# Patient Record
Sex: MALE | Race: ASIAN | NOT HISPANIC OR LATINO | Employment: UNEMPLOYED | URBAN - METROPOLITAN AREA
[De-identification: names, ages, dates, MRNs, and addresses within clinical notes are randomized per-mention and may not be internally consistent; named-entity substitution may affect disease eponyms.]

---

## 2018-12-21 ENCOUNTER — OFFICE VISIT (OUTPATIENT)
Dept: FAMILY MEDICINE | Facility: CLINIC | Age: 48
End: 2018-12-21
Payer: COMMERCIAL

## 2018-12-21 VITALS
OXYGEN SATURATION: 100 % | SYSTOLIC BLOOD PRESSURE: 112 MMHG | WEIGHT: 125.4 LBS | TEMPERATURE: 97.5 F | BODY MASS INDEX: 23.08 KG/M2 | HEART RATE: 66 BPM | DIASTOLIC BLOOD PRESSURE: 79 MMHG | RESPIRATION RATE: 16 BRPM | HEIGHT: 62 IN

## 2018-12-21 DIAGNOSIS — Z23 INFLUENZA VACCINE NEEDED: ICD-10-CM

## 2018-12-21 DIAGNOSIS — M54.59 MECHANICAL LOW BACK PAIN: Primary | ICD-10-CM

## 2018-12-21 SDOH — HEALTH STABILITY: MENTAL HEALTH: HOW OFTEN DO YOU HAVE A DRINK CONTAINING ALCOHOL?: NEVER

## 2018-12-21 ASSESSMENT — MIFFLIN-ST. JEOR: SCORE: 1328.06

## 2018-12-21 NOTE — PROGRESS NOTES
"SUBJECTIVE  HPI: Ladan Brandon is a 48 year old male who presents to [facility] care.    Moved to [place] from [place] in September. Moved to  in 2014 from [facility]. [place].  with 4 kids.     Chews betel nut. Former smoker. About 20 pack yr hx.     Never been hospitalized. No surgeries.    Back pain: has had for over 10 years. Located on left lower back. Intermittent. Pain occurs with lifting heavy things. At work, lifts heavy objects stocking. Radiates to upper back.  No radiation to legs. Has tried an oral medicaiton from [employer] and [employer] which has helped. Currently, is not taking any medication. Denies numbness, tingling, weakness. No saddle anesthesia. No fevers. No gait instability. Also occurred previously when playing [employer] in [employer].    ROS: per HPI.     PMH: Chronic left low back pain.     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     History   Smoking Status     Former Smoker     Years: 20.00     Quit date: 2012   Smokeless Tobacco     Never Used       OBJECTIVE:  Vitals: /79 (BP Location: Right arm, Patient Position: Sitting, Cuff Size: Adult Regular)   Pulse 66   Temp 97.5  F (36.4  C) (Oral)   Resp 16   Ht 1.575 m (5' 2\")   Wt 56.9 kg (125 lb 6.4 oz)   SpO2 100%   BMI 22.94 kg/m    General: Pleasant. Middle-aged man. Thin. No distress.  Heart: Regular rate and rhythm. No murmurs, rubs, or gallops.  Extremities: Extremities warm and well-perfused.  Lungs: Clear to auscultation bilaterally.  Back: Normal to inspection.  No tenderness over spinous processes.  Mild tenderness over left lumbar paraspinal muscles.  No tenderness over SI joints.  Normal ROM in flexion, extension, lateral flexion, and torsion.  Normal heel (L4) and toe (S1) walking.  5/5 strength on resisted great toe dorsiflexion (L5).  Normal patellar reflexes (L4) and Achilles reflexes (S1).  Negative straight leg raise bilaterally.  "     ASSESSMENT & PLAN:      Htjeovany was seen today for establish care.    Diagnoses and all orders for this visit:    Mechanical low back pain: Discussed back care tips.  Provided patient with home exercises.  Recommended ice/heat packs.  Will try naproxen to take as needed for moderate back pain.  -     naproxen (NAPROSYN) 375 MG tablet; Take 1 tablet (375 mg) by mouth 2 times daily as needed for moderate pain    Return to clinic in 2-4 weeks for complete physical.      The patient was actively involved in the decision making process, and all the questions were answered to their satisfaction prior to leaving.       Patient precepted with attending physician, Dr. Méndez.    Tami Hawk, DO   PGY-3 [facility]  Pager: [phone]

## 2018-12-21 NOTE — PROGRESS NOTES
Preceptor Attestation:   Patient seen, evaluated and discussed with the resident. I have verified the content of the note, which accurately reflects my assessment of the patient and the plan of care.   Supervising Physician:  Emanuel Méndez MD

## 2018-12-21 NOTE — NURSING NOTE
Due to patient being non-English speaking/uses sign language, an  was used for this visit. Only for face-to-face interpretation by an external agency, date and length of interpretation can be found on the scanned worksheet.       name: Todd Lawrence  Language: Romana  Agency:  [employer]  Telephone number: [phone]  Type of interpretation:  Face-to-face, spoken

## 2018-12-21 NOTE — NURSING NOTE
Injectable influenza vaccine documentation    1. Has the patient received the information for the influenza vaccine? YES    2. Does the patient have a severe allergy to eggs (Patients with a severe egg allergy should be assessed by a medical provider, RN, or clinical pharmacist. If they receive the influenza vaccine, please have them observed for 15 minutes.)? No    3. Has the patient had an allergic reaction to previous influenza vaccines? No    4. Has the patient had any severe allergic reactions to past influenza vaccines ? No       5. Does patient have a history of Guillain-Bucoda syndrome? No      Based on responses above, I administered the influenza vaccine.  Liberty Castro

## 2018-12-21 NOTE — PATIENT INSTRUCTIONS
Patient Education   Patient Education   Dear Ladan Brandon,    It was a pleasure seeing you in clinic today. Follow up for Complete physical in 2-4 weeks. Please do not hesitate to call or return to clinic if you have an questions or concerns.      Sincerely,    Dr. Hawk    Lumbar Stretch (Flexibility)    1. Lie on your back on the floor, with your knees bent and your feet flat on the floor. Don t press your neck or lower back to the floor.  2. Pull one knee up toward your chest. Clasp your hands under your thigh to help pull.  3. Hold for 30 to 60 seconds. Lower your leg back down to the floor.  4. Repeat 2 times, or as instructed.  5. Switch legs and repeat.  Date Last Reviewed: 3/10/2016    0324-2378 [employer]. [address] [place], [place] [id number]. All rights reserved. This information is not intended as a substitute for professional medical care. Always follow your healthcare professional's instructions.           Back Care Tips    Caring for your back  These are things you can do to prevent a recurrence of acute back pain and to reduce symptoms from chronic back pain:    Maintain a healthy weight. If you are overweight, losing weight will help most types of back pain.    Exercise is an important part of recovery from most types of back pain. The muscles behind and in front of the spine support the back. This means strengthening both the back muscles and the abdominal muscles will provide better support for your spine.     Swimming and brisk walking are good overall exercises to improve your fitness level.    Practice safe lifting methods (below).    Practice good posture when sitting, standing and walking. Avoid prolonged sitting. This puts more stress on the lower back than standing or walking.    Wear quality shoes with sufficient arch support. Foot and ankle alignment can affect back symptoms. Women should avoid wearing high heels.    Therapeutic massage can help relax the back muscles  without stretching them.    During the first 24 to 72 hours after an acute injury or flare-up of chronic back pain, apply an ice pack to the painful area for 20 minutes and then remove it for 20 minutes, over a period of 60 to 90 minutes, or several times a day. As a safety precaution, do not use a heating pad at bedtime. Sleeping on a heating pad can lead to skin burns or tissue damage.    You can alternate ice and heat therapies.  Medicines  Talk to your healthcare provider before using medicines, especially if you have other medical problems or are taking other medicines.    You may use acetaminophen or ibuprofen to control pain, unless your healthcare provider prescribed other pain medicine. If you have chronic conditions like diabetes, liver or kidney disease, stomach ulcers, or gastrointestinal bleeding, or are taking blood thinners, talk with your healthcare provider before taking any medicines.    Be careful if you are given prescription pain medicines, narcotics, or medicine for muscle spasm. They can cause drowsiness, affect your coordination, reflexes, and judgment. Do not drive or operate heavy machinery while taking these types of medicines. Take prescription pain medicine only as prescribed by your healthcare provider.  Lumbar stretch  Here is a simple stretching exercise that will help relax muscle spasm and keep your back more limber. If exercise makes your back pain worse, don t do it.    Lie on your back with your knees bent and both feet on the ground.    Slowly raise your left knee to your chest as you flatten your lower back against the floor. Hold for 5 seconds.    Relax and repeat the exercise with your right knee.    Do 10 of these exercises for each leg.  Safe lifting method    Don t bend over at the waist to lift an object off the floor.  Instead, bend your knees and hips in a squat.     Keep your back and head upright    Hold the object close to your body, directly in front of  you.    Straighten your legs to lift the object.     Lower the object to the floor in the reverse fashion.    If you must slide something across the floor, push it.  Posture tips  Sitting  Sit in chairs with straight backs or low-back support. Keep your knees lower than your hips, with your feet flat on the floor.  When driving, sit up straight. Adjust the seat forward so you are not leaning toward the steering wheel.  A small pillow or rolled towel behind your lower back may help if you are driving long distances.   Standing  When standing for long periods, shift most of your weight to one leg at a time. Alternate legs every few minutes.   Sleeping  The best way to sleep is on your side with your knees bent. Put a low pillow under your head to support your neck in a neutral spine position. Avoid thick pillows that bend your neck to one side. Put a pillow between your legs to further relax your lower back. If you sleep on your back, put pillows under your knees to support your legs in a slightly flexed position. Use a firm mattress. If your mattress sags, replace it, or use a 1/2-inch plywood board under the mattress to add support.  Follow-up care  Follow up with your healthcare provider, or as advised.  If X-rays, a CT scan or an MRI scan were taken, they will be reviewed by a radiologist. You will be notified of any new findings that may affect your care.  Call 911  Call 911 if any of the following occur:    Trouble breathing    Confusion    Very drowsy    Fainting or loss of consciousness    Rapid or very slow heart rate    Loss of  bowel or bladder control  When to seek medical advice  Call your healthcare provider right away if any of the following occur:    Pain becomes worse or spreads to your arms or legs    Weakness or numbness in one or both arms or legs    Numbness in the groin area  Date Last Reviewed: 6/1/2016 2000-2018 [employer]. [address] [facility], [place] [address]. All rights  reserved. This information is not intended as a substitute for professional medical care. Always follow your healthcare professional's instructions.

## 2022-01-25 ENCOUNTER — OFFICE VISIT (OUTPATIENT)
Dept: FAMILY MEDICINE | Facility: CLINIC | Age: 52
End: 2022-01-25
Payer: COMMERCIAL

## 2022-01-25 VITALS
TEMPERATURE: 97.6 F | WEIGHT: 131.8 LBS | BODY MASS INDEX: 24.11 KG/M2 | OXYGEN SATURATION: 99 % | HEART RATE: 64 BPM | RESPIRATION RATE: 16 BRPM

## 2022-01-25 DIAGNOSIS — Z23 NEED FOR VACCINATION: ICD-10-CM

## 2022-01-25 DIAGNOSIS — Z23 NEED FOR PROPHYLACTIC VACCINATION AND INOCULATION AGAINST INFLUENZA: ICD-10-CM

## 2022-01-25 DIAGNOSIS — Z00.00 ROUTINE GENERAL MEDICAL EXAMINATION AT A HEALTH CARE FACILITY: Primary | ICD-10-CM

## 2022-01-25 DIAGNOSIS — R53.83 FATIGUE, UNSPECIFIED TYPE: ICD-10-CM

## 2022-01-25 LAB
BASOPHILS # BLD AUTO: 0 10E3/UL (ref 0–0.2)
BASOPHILS NFR BLD AUTO: 1 %
CHOLEST SERPL-MCNC: 269 MG/DL
EOSINOPHIL # BLD AUTO: 0.2 10E3/UL (ref 0–0.7)
EOSINOPHIL NFR BLD AUTO: 3 %
ERYTHROCYTE [DISTWIDTH] IN BLOOD BY AUTOMATED COUNT: 12.3 % (ref 10–15)
FASTING STATUS PATIENT QL REPORTED: ABNORMAL
HBA1C MFR BLD: 5.6 % (ref 0–5.6)
HCT VFR BLD AUTO: 44.1 % (ref 40–53)
HDLC SERPL-MCNC: 43 MG/DL
HGB BLD-MCNC: 14.7 G/DL (ref 13.3–17.7)
HIV 1+2 AB+HIV1 P24 AG SERPL QL IA: NEGATIVE
IMM GRANULOCYTES # BLD: 0 10E3/UL
IMM GRANULOCYTES NFR BLD: 0 %
LDLC SERPL CALC-MCNC: 185 MG/DL
LYMPHOCYTES # BLD AUTO: 1.5 10E3/UL (ref 0.8–5.3)
LYMPHOCYTES NFR BLD AUTO: 26 %
MCH RBC QN AUTO: 28.8 PG (ref 26.5–33)
MCHC RBC AUTO-ENTMCNC: 33.3 G/DL (ref 31.5–36.5)
MCV RBC AUTO: 87 FL (ref 78–100)
MONOCYTES # BLD AUTO: 0.3 10E3/UL (ref 0–1.3)
MONOCYTES NFR BLD AUTO: 5 %
NEUTROPHILS # BLD AUTO: 3.8 10E3/UL (ref 1.6–8.3)
NEUTROPHILS NFR BLD AUTO: 65 %
PLATELET # BLD AUTO: 248 10E3/UL (ref 150–450)
RBC # BLD AUTO: 5.1 10E6/UL (ref 4.4–5.9)
TRIGL SERPL-MCNC: 207 MG/DL
WBC # BLD AUTO: 5.7 10E3/UL (ref 4–11)

## 2022-01-25 PROCEDURE — 36415 COLL VENOUS BLD VENIPUNCTURE: CPT | Performed by: FAMILY MEDICINE

## 2022-01-25 PROCEDURE — 99000 SPECIMEN HANDLING OFFICE-LAB: CPT | Performed by: FAMILY MEDICINE

## 2022-01-25 PROCEDURE — 86787 VARICELLA-ZOSTER ANTIBODY: CPT | Performed by: FAMILY MEDICINE

## 2022-01-25 PROCEDURE — 90686 IIV4 VACC NO PRSV 0.5 ML IM: CPT | Performed by: FAMILY MEDICINE

## 2022-01-25 PROCEDURE — 90715 TDAP VACCINE 7 YRS/> IM: CPT | Performed by: FAMILY MEDICINE

## 2022-01-25 PROCEDURE — 86708 HEPATITIS A ANTIBODY: CPT | Performed by: FAMILY MEDICINE

## 2022-01-25 PROCEDURE — 87389 HIV-1 AG W/HIV-1&-2 AB AG IA: CPT | Performed by: FAMILY MEDICINE

## 2022-01-25 PROCEDURE — 90472 IMMUNIZATION ADMIN EACH ADD: CPT | Performed by: FAMILY MEDICINE

## 2022-01-25 PROCEDURE — 90471 IMMUNIZATION ADMIN: CPT | Performed by: FAMILY MEDICINE

## 2022-01-25 PROCEDURE — 85025 COMPLETE CBC W/AUTO DIFF WBC: CPT | Performed by: FAMILY MEDICINE

## 2022-01-25 PROCEDURE — 86762 RUBELLA ANTIBODY: CPT | Performed by: FAMILY MEDICINE

## 2022-01-25 PROCEDURE — 83036 HEMOGLOBIN GLYCOSYLATED A1C: CPT | Performed by: FAMILY MEDICINE

## 2022-01-25 PROCEDURE — 99386 PREV VISIT NEW AGE 40-64: CPT | Mod: 25 | Performed by: FAMILY MEDICINE

## 2022-01-25 PROCEDURE — 86706 HEP B SURFACE ANTIBODY: CPT | Performed by: FAMILY MEDICINE

## 2022-01-25 PROCEDURE — 86803 HEPATITIS C AB TEST: CPT | Performed by: FAMILY MEDICINE

## 2022-01-25 PROCEDURE — 80061 LIPID PANEL: CPT | Performed by: FAMILY MEDICINE

## 2022-01-25 PROCEDURE — 87350 HEPATITIS BE AG IA: CPT | Mod: 90 | Performed by: FAMILY MEDICINE

## 2022-01-25 NOTE — LETTER
2022      Ladan Brandon  [address]   [place] [address]        Dear ,    We are writing to inform you of your test results.      You have protection for hepatitis A and B,/that is good   You have protection for Rubella and varicella/that is good   You are negative for HIV and Hepatitis C/that is good    Your cholesterol is high , eat more vegetables and will discussed cholesterol reduction in you next visit     Resulted Orders   Rubella Antibody IgG   Result Value Ref Range    Rubella Teresa IgG Instrument Value 2.97 (H) <0.90 Index    Rubella Antibody IgG Positive (A) Negative      Comment:      Suggests previous exposure or immunization and probable immunity.   Varicella Zoster Virus Antibody IgG   Result Value Ref Range    VZV Teresa IgG Instrument Value 187.8 (H) <135.0 Index    Varicella Zoster Antibody IgG Positive (A) No detectable antibody.       Comment:      Suggests previous exposure or immunization and probable immunity.   HIV Ag/Ab Screen Cascade   Result Value Ref Range    HIV Antigen Antibody Combo Negative Negative   Hepatitis C antibody   Result Value Ref Range    Hepatitis C Antibody Negative Negative    Narrative    Assay performance characteristics have not been established for newborns, infants, children (<18 years) or populations of immunocompromised or immunosuppressed patients.    Hepatitis A Antibody IgG   Result Value Ref Range    Hepatitis A Antibody IgG Positive (A) Negative    Narrative    Performance of the Anti-HAV, IgG test  has not been established with  specimens (<= 2 months of age).   Hepatitis Be antigen   Result Value Ref Range    Hepatitis Be Agn Negative Negative      Comment:      Performed by [email or web address],  [address] [place] [id number] [phone]  [email or web address], Sue Diamond MD, Lab. Director   Hepatitis B Surface Antibody   Result Value Ref Range    Hepatitis B Surface Antibody Positive (A) Negative   Hemoglobin A1c   Result Value Ref  Range    Hemoglobin A1C 5.6 0.0 - 5.6 %      Comment:      Normal <5.7%   Prediabetes 5.7-6.4%    Diabetes 6.5% or higher     Note: Adopted from ADA consensus guidelines.   Lipid Profile   Result Value Ref Range    Cholesterol 269 (H) <=199 mg/dL    Triglycerides 207 (H) <=149 mg/dL    Direct Measure HDL 43 >=40 mg/dL      Comment:      HDL Cholesterol Reference Range:     0-2 years:   No reference ranges established for patients under 2 years old  at [facility] for lipid analytes.    2-8 years:  Greater than 45 mg/dL     18 years and older:   Female: Greater than or equal to 50 mg/dL   Male:   Greater than or equal to 40 mg/dL    LDL Cholesterol Calculated 185 (H) <=129 mg/dL    Patient Fasting > 8hrs? Unknown    CBC with platelets and differential   Result Value Ref Range    WBC Count 5.7 4.0 - 11.0 10e3/uL    RBC Count 5.10 4.40 - 5.90 10e6/uL    Hemoglobin 14.7 13.3 - 17.7 g/dL    Hematocrit 44.1 40.0 - 53.0 %    MCV 87 78 - 100 fL    MCH 28.8 26.5 - 33.0 pg    MCHC 33.3 31.5 - 36.5 g/dL    RDW 12.3 10.0 - 15.0 %    Platelet Count 248 150 - 450 10e3/uL    % Neutrophils 65 %    % Lymphocytes 26 %    % Monocytes 5 %    % Eosinophils 3 %    % Basophils 1 %    % Immature Granulocytes 0 %    Absolute Neutrophils 3.8 1.6 - 8.3 10e3/uL    Absolute Lymphocytes 1.5 0.8 - 5.3 10e3/uL    Absolute Monocytes 0.3 0.0 - 1.3 10e3/uL    Absolute Eosinophils 0.2 0.0 - 0.7 10e3/uL    Absolute Basophils 0.0 0.0 - 0.2 10e3/uL    Absolute Immature Granulocytes 0.0 <=0.4 10e3/uL       If you have any questions or concerns, please call the clinic at the number listed above.       Sincerely,      You Sotelo MD

## 2022-01-25 NOTE — PROGRESS NOTES
Male Physical Note      Concerns today: No special concerns today.  Not working  A  [employer]  was used for  this visit.     ROS:                      CONSTITUTIONAL: no fatigue, no unexpected change in weight  SKIN: no worrisome rashes, no worrisome moles, no worrisome lesions  EYES: no acute vision problems or changes  ENT: no ear problems, no mouth problems, no throat problems  RESP: no significant cough, no shortness of breath  CV: no chest pain, no palpitations, no new or worsening peripheral edema  GI: no nausea, no vomiting, no constipation, no diarrhea    History reviewed. No pertinent past medical history.     History reviewed. No pertinent family history.  Reviewed no other significant FH           Family History and past Medical History reviewed and unchanged/updated.    Social History     Tobacco Use     Smoking status: Former Smoker     Years: 20.00     Quit date: 2012     Years since quitting: 10.0     Smokeless tobacco: Never Used   Substance Use Topics     Alcohol use: No       Children ? yes  4    Has anyone hurt you physically, for example by pushing, hitting, slapping or kicking you or forcing you to have sex? Denies  Do you feel threatened or controlled by a partner, ex-partner or anyone in your life? Denies    RISK BEHAVIORS AND HEALTHY HABITS:  Tobacco Use/Smoking: None  Illicit Drug Use: None  ETOH: None  Do you use alcohol? No        Sexually Active: Yes  Diet (5-7 servings of fruits/veg daily): Yes   Exercise (30 min accumulated most days):Yes  Dental Care: No   Calcium 1500 mg/d:  Yes  Seat Belt Use: Yes       Colon CA Screening (>50-75 ):  Recommended and patient declined testing.  Patient declined PSA screening.        Immunization History   Administered Date(s) Administered     Influenza Vaccine, 6+MO IM (QUADRIVALENT W/PRESERVATIVES) 12/21/2018     Reviewed Immunization Record Today    EXAMINATION:  Pulse 64   Temp 97.6  F (36.4  C) (Oral)   Resp 16   Wt 59.8 kg (131  lb 12.8 oz)   SpO2 99%   BMI 24.11 kg/m    GENERAL: healthy, alert and no distress  EYES: Eyes grossly normal to inspection, extraocular movements - intact, and PERRL  HENT: ear canals- normal; TMs- normal; Nose- normal; Mouth- no ulcers, no lesions  NECK: no tenderness, no adenopathy, no asymmetry, no masses, no stiffness; thyroid- normal to palpation  RESP: lungs clear to auscultation - no rales, no rhonchi, no wheezes  BREAST: no masses, no tenderness, no nipple discharge, no palpable axillary masses or adenopathy  CV: regular rates and rhythm, normal S1 S2, no S3 or S4 and no murmur, no click or rub -  ABDOMEN: soft, no tenderness, no  hepatosplenomegaly, no masses, normal bowel sounds  MS: extremities- no gross deformities noted, no edema  SKIN: no suspicious lesions, no rashes  NEURO: strength and tone- normal, sensory exam- grossly normal, mentation- intact, speech- normal, reflexes- symmetric  BACK: no CVA tenderness, no paralumbar tenderness  - male: testicles- normal, no atrophy, no masses;  no inguinal hernias  RECTAL-declines :   Alert and oriented times 3; speech- coherent , normal rate and volume; able to articulate logical thoughts, able to abstract reason, no tangential thoughts, no hallucinations or delusions, affect- normal  LYMPHATICS: ant. cervical- normal, post. cervical- normal, axillary- normal, supraclavicular- normal, inguinal- normal    ASSESSMENT:  1. Health Care Maintenance: Normal Physical Exam  2 Fatigue/hx of dizziness CBC  1.Lipid panel ordered.   2 A1C  3. Declines COVID and colonoscopy  4.Accepts flu and TdaP    5 Vaccinations records not available : titers and up date vaccin per age recommendatiosn

## 2022-01-25 NOTE — NURSING NOTE
Due to patient being non-English speaking/uses sign language, an  was used for this visit. Only for face-to-face interpretation by an external agency, date and length of interpretation can be found on the scanned worksheet.     name: [id number]   Agency:  [facility]    Language: Romana   Telephone number: [phone]  Type of interpretation: Telephone, spoken

## 2022-01-25 NOTE — PATIENT INSTRUCTIONS
Preventive Health Recommendations  Male Ages 50 - 64    Yearly exam:             See your health care provider every year in order to  o   Review health changes.   o   Discuss preventive care.    o   Review your medicines if your doctor has prescribed any.     Have a cholesterol test every 5 years, or more frequently if you are at risk for high cholesterol/heart disease.     Have a diabetes test (fasting glucose) every three years. If you are at risk for diabetes, you should have this test more often.     Have a colonoscopy at age 50, or have a yearly FIT test (stool test). These exams will check for colon cancer.      Talk with your health care provider about whether or not a prostate cancer screening test (PSA) is right for you.    You should be tested each year for STDs (sexually transmitted diseases), if you re at risk.     Shots: Get a flu shot each year. Get a tetanus shot every 10 years.     Nutrition:    Eat at least 5 servings of fruits and vegetables daily.     Eat whole-grain bread, whole-wheat pasta and brown rice instead of white grains and rice.     Get adequate Calcium and Vitamin D.     Lifestyle    Exercise for at least 150 minutes a week (30 minutes a day, 5 days a week). This will help you control your weight and prevent disease.     Limit alcohol to one drink per day.     No smoking.     Wear sunscreen to prevent skin cancer.     See your dentist every six months for an exam and cleaning.     See your eye doctor every 1 to 2 years.       Consider COVID vaccination  Consider colonoscopy/colon cancer screning  Schedule visit foe  intermittent dizziness evaluation that you have had since childhood/checking anemia today    See dentist for teeth care

## 2022-01-26 LAB
HAV IGG SER QL IA: POSITIVE
HBV SURFACE AB SERPLBLD QL IA.RAPID: POSITIVE
HCV AB SERPL QL IA: NEGATIVE
RUBV IGG SERPL QL IA: 2.97 INDEX
RUBV IGG SERPL QL IA: POSITIVE
VZV IGG SER QL IA: 187.8 INDEX
VZV IGG SER QL IA: POSITIVE

## 2022-01-27 LAB — HBV E AG SERPL QL IA: NEGATIVE

## 2022-02-14 ENCOUNTER — OFFICE VISIT (OUTPATIENT)
Dept: FAMILY MEDICINE | Facility: CLINIC | Age: 52
End: 2022-02-14
Payer: COMMERCIAL

## 2022-02-14 VITALS
DIASTOLIC BLOOD PRESSURE: 72 MMHG | RESPIRATION RATE: 20 BRPM | TEMPERATURE: 97.7 F | HEART RATE: 69 BPM | SYSTOLIC BLOOD PRESSURE: 117 MMHG | OXYGEN SATURATION: 100 % | BODY MASS INDEX: 24.44 KG/M2 | WEIGHT: 133.6 LBS

## 2022-02-14 DIAGNOSIS — R42 DIZZINESS: Primary | ICD-10-CM

## 2022-02-14 PROCEDURE — 99214 OFFICE O/P EST MOD 30 MIN: CPT | Mod: GC | Performed by: STUDENT IN AN ORGANIZED HEALTH CARE EDUCATION/TRAINING PROGRAM

## 2022-02-14 RX ORDER — MECLIZINE HYDROCHLORIDE 25 MG/1
25 TABLET ORAL 3 TIMES DAILY PRN
Qty: 30 TABLET | Refills: 1 | Status: SHIPPED | OUTPATIENT
Start: 2022-02-14 | End: 2022-08-03

## 2022-02-14 NOTE — PROGRESS NOTES
Assessment & Plan     Dizziness  Discussed last visit's lab results. Given chronicity of symptoms, normal hemoglobin, positional nature, most likely vestibular or BPPV. No palpitations or other cardiac symptoms. Does not appear to be worsening so will not obtain imaging today. Unable to assess [place] today. Difficult to diagnose based off reported symptoms.  Will start with physical therapy and meclizine and patient will attempt to schedule follow up when he is having symptoms.   - Physical Therapy Referral; Future  - meclizine (ANTIVERT) 25 MG tablet; Take 1 tablet (25 mg) by mouth 3 times daily as needed for dizziness    SDOH: Non-English speaking, difficulty with transportation     See Patient Instructions    No follow-ups on file.    Nazanin Casas MD  [facility]    Reta Pickering is a 52 year old who presents for the following health issues  accompanied by his .    HPI     Patient is here to discuss dizziness and follow up on labs. This has been occurring since he was a child. He describes the dizziness as nausea, vomiting, chills, sweating, needs to breathe fast, tired, feels like he can't move. This worsens to the point he cannot walk. He needs to lie down right away. These episodes last for up to a week. No changes in vision. His face turns pale. [place] advised to drink lime juice, rest, folic acid for more blood strength. No palpitations or chest pain. This last happened last month and typically occurs when he has to move more. If he has to spin boxes at work this happens often. He is not currently working. 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      Review of Systems   Constitutional, HEENT, cardiovascular, pulmonary, gi and gu systems are negative, except as otherwise noted.      Objective    /72   Pulse 69   Temp 97.7  F (36.5   C) (Oral)   Resp 20   Wt 60.6 kg (133 lb 9.6 oz)   SpO2 100%   BMI 24.44 kg/m    Body mass index is 24.44 kg/m .  Physical Exam   GENERAL: healthy, alert and no distress  NECK: no adenopathy, no asymmetry, masses, or scars and thyroid normal to palpation  RESP: lungs clear to auscultation - no rales, rhonchi or wheezes  CV: regular rate and rhythm, normal S1 S2, no S3 or S4, no murmur, click or rub, no peripheral edema and peripheral pulses strong  ABDOMEN: soft, nontender, no hepatosplenomegaly, no masses and bowel sounds normal  MS: no gross musculoskeletal defects noted, no edema  NEURO: Normal strength and tone, mentation intact and speech normal. No dizziness with rapid horizontal eye movement. Rapid vertical head movement induced dizziness/patient stated similar to his symptoms.    The 10-year ASCVD risk score (Vivian SUAZO Jr., et al., 2013) is: 5.3%    Values used to calculate the score:      Age: 52 years      Sex: Male      Is Non- : No      Diabetic: No      Tobacco smoker: No      Systolic Blood Pressure: 117 mmHg      Is BP treated: No      HDL Cholesterol: 43 mg/dL      Total Cholesterol: 269 mg/dL      Office Visit on 01/25/2022   Component Date Value Ref Range Status     Rubella Teresa IgG Instrument Value 01/25/2022 2.97* <0.90 Index Final     Rubella Antibody IgG 01/25/2022 Positive* Negative Final    Suggests previous exposure or immunization and probable immunity.     VZV Teresa IgG Instrument Value 01/25/2022 187.8* <135.0 Index Final     Varicella Zoster Antibody IgG 01/25/2022 Positive* No detectable antibody.  Final    Suggests previous exposure or immunization and probable immunity.     HIV Antigen Antibody Combo 01/25/2022 Negative  Negative Final     Hepatitis C Antibody 01/25/2022 Negative  Negative Final     Hepatitis A Antibody IgG 01/25/2022 Positive* Negative Final     Hepatitis Be Agn 01/25/2022 Negative  Negative Final    Performed by [facility],  [address]  [place] [address] [phone]  [email or web address], Sue Diamond MD, Lab. Director     Hepatitis B Surface Antibody 01/25/2022 Positive* Negative Final     Hemoglobin A1C 01/25/2022 5.6  0.0 - 5.6 % Final    Normal <5.7%   Prediabetes 5.7-6.4%    Diabetes 6.5% or higher     Note: Adopted from ADA consensus guidelines.     Cholesterol 01/25/2022 269* <=199 mg/dL Final     Triglycerides 01/25/2022 207* <=149 mg/dL Final     Direct Measure HDL 01/25/2022 43  >=40 mg/dL Final    HDL Cholesterol Reference Range:     0-2 years:   No reference ranges established for patients under 2 years old  at [facility] for lipid analytes.    2-8 years:  Greater than 45 mg/dL     18 years and older:   Female: Greater than or equal to 50 mg/dL   Male:   Greater than or equal to 40 mg/dL     LDL Cholesterol Calculated 01/25/2022 185* <=129 mg/dL Final     Patient Fasting > 8hrs? 01/25/2022 Unknown   Final     WBC Count 01/25/2022 5.7  4.0 - 11.0 10e3/uL Final     RBC Count 01/25/2022 5.10  4.40 - 5.90 10e6/uL Final     Hemoglobin 01/25/2022 14.7  13.3 - 17.7 g/dL Final     Hematocrit 01/25/2022 44.1  40.0 - 53.0 % Final     MCV 01/25/2022 87  78 - 100 fL Final     MCH 01/25/2022 28.8  26.5 - 33.0 pg Final     MCHC 01/25/2022 33.3  31.5 - 36.5 g/dL Final     RDW 01/25/2022 12.3  10.0 - 15.0 % Final     Platelet Count 01/25/2022 248  150 - 450 10e3/uL Final     % Neutrophils 01/25/2022 65  % Final     % Lymphocytes 01/25/2022 26  % Final     % Monocytes 01/25/2022 5  % Final     % Eosinophils 01/25/2022 3  % Final     % Basophils 01/25/2022 1  % Final     % Immature Granulocytes 01/25/2022 0  % Final     Absolute Neutrophils 01/25/2022 3.8  1.6 - 8.3 10e3/uL Final     Absolute Lymphocytes 01/25/2022 1.5  0.8 - 5.3 10e3/uL Final     Absolute Monocytes 01/25/2022 0.3  0.0 - 1.3 10e3/uL Final     Absolute Eosinophils 01/25/2022 0.2  0.0 - 0.7 10e3/uL Final     Absolute Basophils 01/25/2022 0.0  0.0 - 0.2 10e3/uL Final      Absolute Immature Granulocytes 01/25/2022 0.0  <=0.4 10e3/uL Final       ----- Service Performed and Documented by Resident or Fellow ------

## 2022-02-14 NOTE — NURSING NOTE
Due to patient being non-English speaking/uses sign language, an  was used for this visit. Only for face-to-face interpretation by an external agency, date and length of interpretation can be found on the scanned worksheet.     name: sim day          ID:[id number]  Agency: [facility] [employer] Phone [employer]preting  Language: Romana   Telephone number: [phone]  Type of interpretation: Telephone, spoken

## 2022-02-14 NOTE — PROGRESS NOTES
Preceptor attestation:  Vital signs reviewed: /72   Pulse 69   Temp 97.7  F (36.5  C) (Oral)   Resp 20   Wt 60.6 kg (133 lb 9.6 oz)   SpO2 100%   BMI 24.44 kg/m      Patient seen, evaluated, and discussed with the resident.  I have verified the content of the note, which accurately reflects my assessment of the patient and the plan of care.    Supervising physician: Madelyn Lanier MD  [facility]

## 2022-02-14 NOTE — PATIENT INSTRUCTIONS
Thank you for discussing your health with us today!    We discussed the following during your visit:    1. I think your dizziness might be caused by inner ear balance issues or positioning.   2. Please try taking a meclizine when you first feel dizzy  3. I have referred you to physical therapy for help with balance and movement    Please make an appointment in 1 month to follow up on dizziness. Please et us know when you are having another episode    As always, please call the clinic if you have any questions or concerns.

## 2022-08-03 ENCOUNTER — OFFICE VISIT (OUTPATIENT)
Dept: FAMILY MEDICINE | Facility: CLINIC | Age: 52
End: 2022-08-03
Payer: COMMERCIAL

## 2022-08-03 VITALS
WEIGHT: 131.2 LBS | OXYGEN SATURATION: 98 % | RESPIRATION RATE: 20 BRPM | DIASTOLIC BLOOD PRESSURE: 75 MMHG | SYSTOLIC BLOOD PRESSURE: 113 MMHG | HEIGHT: 62 IN | BODY MASS INDEX: 24.14 KG/M2 | TEMPERATURE: 97.5 F | HEART RATE: 67 BPM

## 2022-08-03 DIAGNOSIS — R50.9 FEVER, UNSPECIFIED FEVER CAUSE: ICD-10-CM

## 2022-08-03 DIAGNOSIS — R42 DIZZINESS: ICD-10-CM

## 2022-08-03 DIAGNOSIS — L08.9 LOCAL INFECTION OF WOUND: Primary | ICD-10-CM

## 2022-08-03 DIAGNOSIS — T14.8XXA LOCAL INFECTION OF WOUND: Primary | ICD-10-CM

## 2022-08-03 LAB
BASOPHILS # BLD AUTO: 0 10E3/UL (ref 0–0.2)
BASOPHILS NFR BLD AUTO: 0 %
EOSINOPHIL # BLD AUTO: 0.1 10E3/UL (ref 0–0.7)
EOSINOPHIL NFR BLD AUTO: 1 %
ERYTHROCYTE [DISTWIDTH] IN BLOOD BY AUTOMATED COUNT: 12.4 % (ref 10–15)
HCT VFR BLD AUTO: 37.1 % (ref 40–53)
HGB BLD-MCNC: 12.3 G/DL (ref 13.3–17.7)
IMM GRANULOCYTES # BLD: 0 10E3/UL
IMM GRANULOCYTES NFR BLD: 0 %
LYMPHOCYTES # BLD AUTO: 1.4 10E3/UL (ref 0.8–5.3)
LYMPHOCYTES NFR BLD AUTO: 24 %
MCH RBC QN AUTO: 28.2 PG (ref 26.5–33)
MCHC RBC AUTO-ENTMCNC: 33.2 G/DL (ref 31.5–36.5)
MCV RBC AUTO: 85 FL (ref 78–100)
MONOCYTES # BLD AUTO: 0.3 10E3/UL (ref 0–1.3)
MONOCYTES NFR BLD AUTO: 6 %
NEUTROPHILS # BLD AUTO: 4.1 10E3/UL (ref 1.6–8.3)
NEUTROPHILS NFR BLD AUTO: 69 %
PLATELET # BLD AUTO: 172 10E3/UL (ref 150–450)
RBC # BLD AUTO: 4.36 10E6/UL (ref 4.4–5.9)
SARS-COV-2 RNA RESP QL NAA+PROBE: NEGATIVE
WBC # BLD AUTO: 5.9 10E3/UL (ref 4–11)

## 2022-08-03 PROCEDURE — 36415 COLL VENOUS BLD VENIPUNCTURE: CPT

## 2022-08-03 PROCEDURE — U0003 INFECTIOUS AGENT DETECTION BY NUCLEIC ACID (DNA OR RNA); SEVERE ACUTE RESPIRATORY SYNDROME CORONAVIRUS 2 (SARS-COV-2) (CORONAVIRUS DISEASE [COVID-19]), AMPLIFIED PROBE TECHNIQUE, MAKING USE OF HIGH THROUGHPUT TECHNOLOGIES AS DESCRIBED BY CMS-2020-01-R: HCPCS

## 2022-08-03 PROCEDURE — 85025 COMPLETE CBC W/AUTO DIFF WBC: CPT

## 2022-08-03 PROCEDURE — 99213 OFFICE O/P EST LOW 20 MIN: CPT | Mod: CS

## 2022-08-03 PROCEDURE — U0005 INFEC AGEN DETEC AMPLI PROBE: HCPCS

## 2022-08-03 RX ORDER — MECLIZINE HYDROCHLORIDE 25 MG/1
25 TABLET ORAL 3 TIMES DAILY PRN
Qty: 30 TABLET | Refills: 1 | Status: SHIPPED | OUTPATIENT
Start: 2022-08-03 | End: 2023-05-11

## 2022-08-03 NOTE — PROGRESS NOTES
Assessment & Plan   Fever, unspecified fever cause  Local infection of wound  1 week of symptoms including subjective fever and cough, worsening of chronic dizziness.  Also reports noticing wound on right medial thigh 1 week ago, no known trauma to the area, no other similar wounds.  No pus, other drainage, worsening erythema, worsening pain to area.  On exam wound is approximately 1 cm in diameter and 1 cm in depth, dressing is clear, dry, intact.  No pus, drainage, erythema.  Unclear etiology of wound, does not appear infected though concerning given fever presenting in a similar time.  Patient is afebrile, vitally stable.  Fever could also be viral.  - CBC with Platelets & Differential; Future  - Symptomatic; Yes; 7/27/2022 COVID-19 Virus (Coronavirus) by PCR Nose  - CBC with Platelets & Differential  -Return to clinic if symptoms worsen including continued fevers, shortness of breath, worsening pain in wound, worsening erythema to wound.    Dizziness  Patient presents due to fevers and worsening of chronic dizziness.  Was seen 2/14/2022 for dizziness at that time thought to be vestibular versus BPPV.  Was recommended to see PT however reports that he has not heard from them though would like to see them for treatment management of dizziness.  Reports meclizine is helpful and needs a refill.   -Refill of meclizine (ANTIVERT) 25 MG tablet; Take 1 tablet (25 mg) by mouth 3 times daily as needed for dizziness  Dispense: 30 tablet; Refill: 1  -Will route to referral coordinator to check in on previous PT referral      Za Moreau MD  [facility]    Subjective   Htee is a 52 year old accompanied by his son who is translating and formally, patient declined professional , presenting for the following health issues:  Fever (Fever, dizziness, coughing, headaches.  Been sick for a weeks now.)    1 week of symptoms: Fever/chills, fast breathing, cough, dizziness is worse. Has chronic  "dizziness.     Reports no: Runny nose, no diarrhea, no stomach pain, no abdominal pain, no tinnitus, no vomiting.    PT for dizziness management: Has not yet heard from PT, referral was placed 2/14/2022.  Romana speaking, may have missed call.  Would like to pursue this if this is still available.    Wound R medial thigh: 1 week, dressing it at home. No pus or other drainage. Pain is slightly improved.  No known trauma to the area.  Has never had a similar wound, no other rashes or other skin concerns.    Review of Systems   As above      Objective    /75   Pulse 67   Temp 97.5  F (36.4  C) (Oral)   Resp 20   Ht 1.57 m (5' 1.8\")   Wt 59.5 kg (131 lb 3.2 oz)   SpO2 98%   BMI 24.15 kg/m    Body mass index is 24.15 kg/m .  Physical Exam  Constitutional:       Appearance: He is not toxic-appearing or diaphoretic.      Comments: Patient is uncomfortable and laying on exam table with symptoms of dizziness   Eyes:      Extraocular Movements: Extraocular movements intact.      Conjunctiva/sclera: Conjunctivae normal.   Cardiovascular:      Rate and Rhythm: Normal rate and regular rhythm.      Pulses: Normal pulses.      Heart sounds: Normal heart sounds.   Pulmonary:      Effort: Pulmonary effort is normal. No respiratory distress.      Breath sounds: Normal breath sounds.   Skin:     General: Skin is warm and dry.      Comments: R medial thigh: 1 cm wound, approximately 1 cm in depth.  Nonerythematous, no pus or other drainage.  Dressing is clean, intact, dry   Neurological:      General: No focal deficit present.      Mental Status: He is alert and oriented to person, place, and time.      Motor: No weakness.      Gait: Gait normal.          "

## 2022-08-03 NOTE — Clinical Note
Hello, this patient had been referred to PT for his dizziness thought to be related to BPPV though he had not heard from them to set up an appointment.  Would you be able to help with this?

## 2022-08-03 NOTE — PROGRESS NOTES
Preceptor Attestation:    I discussed the patient with the resident and evaluated the patient in person. I have verified the content of the note, which accurately reflects my assessment of the patient and the plan of care.   Supervising Physician:  Harman Figueroa DO.

## 2022-08-03 NOTE — LETTER
August 10, 2022      [address]   [place] [address]        Dear ,    Your COVID is negative and their white blood cell count is normal. This means they likely do not have a concerning infection. Okay to continue to manage at home and please return if pain worsens or it is not healing. There is mild anemia (low hemoglobin) though this was just abnormal once and previously normal so no intervention needed, we will just monitor it.     Thanks!       Resulted Orders   Symptomatic; Yes; 7/27/2022 COVID-19 Virus (Coronavirus) by PCR Nose   Result Value Ref Range    SARS CoV2 PCR Negative Negative, Testing sent to reference lab. Results will be returned via unsolicited result      Comment:      NEGATIVE: SARS-CoV-2 (COVID-19) RNA not detected, presumed negative.    Narrative    Testing was performed using the Aptima SARS-CoV-2 Assay on the  [employer] Instrument System. Additional information about this  Emergency Use Authorization (EUA) assay can be found via the Lab  Guide. This test should be ordered for the detection of SARS-CoV-2 in  individuals who meet SARS-CoV-2 clinical and/or epidemiological  criteria. Test performance is unknown in asymptomatic patients. This  test is for in vitro diagnostic use under the FDA EUA for  laboratories certified under CLIA to perform high complexity testing.  This test has not been FDA cleared or approved. A negative result  does not rule out the presence of PCR inhibitors in the specimen or  target RNA in concentration below the limit of detection for the  assay. The possibility of a false negative should be considered if  the patient's recent exposure or clinical presentation suggests  COVID-19. This test was validated by the [facility]. This laboratory is certified under  the Clinical Laboratory Improvement Amendments of 1988 (CLIA-88) as  qualified to perform high complexity laboratory testing.   CBC with  platelets and differential   Result Value Ref Range    WBC Count 5.9 4.0 - 11.0 10e3/uL    RBC Count 4.36 (L) 4.40 - 5.90 10e6/uL    Hemoglobin 12.3 (L) 13.3 - 17.7 g/dL    Hematocrit 37.1 (L) 40.0 - 53.0 %    MCV 85 78 - 100 fL    MCH 28.2 26.5 - 33.0 pg    MCHC 33.2 31.5 - 36.5 g/dL    RDW 12.4 10.0 - 15.0 %    Platelet Count 172 150 - 450 10e3/uL    % Neutrophils 69 %    % Lymphocytes 24 %    % Monocytes 6 %    % Eosinophils 1 %    % Basophils 0 %    % Immature Granulocytes 0 %    Absolute Neutrophils 4.1 1.6 - 8.3 10e3/uL    Absolute Lymphocytes 1.4 0.8 - 5.3 10e3/uL    Absolute Monocytes 0.3 0.0 - 1.3 10e3/uL    Absolute Eosinophils 0.1 0.0 - 0.7 10e3/uL    Absolute Basophils 0.0 0.0 - 0.2 10e3/uL    Absolute Immature Granulocytes 0.0 <=0.4 10e3/uL       If you have any questions or concerns, please call the clinic at the number listed above.       Sincerely,      Hina Moreau MD

## 2023-04-27 ENCOUNTER — OFFICE VISIT (OUTPATIENT)
Dept: FAMILY MEDICINE | Facility: CLINIC | Age: 53
End: 2023-04-27
Payer: COMMERCIAL

## 2023-04-27 VITALS
RESPIRATION RATE: 24 BRPM | HEART RATE: 80 BPM | DIASTOLIC BLOOD PRESSURE: 77 MMHG | SYSTOLIC BLOOD PRESSURE: 107 MMHG | WEIGHT: 133.6 LBS | BODY MASS INDEX: 24.59 KG/M2 | OXYGEN SATURATION: 97 % | TEMPERATURE: 97.8 F

## 2023-04-27 DIAGNOSIS — L21.9 SEBORRHEIC DERMATITIS: Primary | ICD-10-CM

## 2023-04-27 PROCEDURE — 99213 OFFICE O/P EST LOW 20 MIN: CPT | Mod: GC

## 2023-04-27 RX ORDER — KETOCONAZOLE 20 MG/ML
SHAMPOO TOPICAL DAILY PRN
Qty: 120 ML | Refills: 1 | Status: SHIPPED | OUTPATIENT
Start: 2023-04-27

## 2023-04-27 NOTE — PROGRESS NOTES
Assessment & Plan     Seborrheic dermatitis  1 week history of pruritic, flaky rash on face, scalp, and ears. On exam, flaking is noticed around ears and scalp. Non vesicular papules in the scalp area behind the ears. Erythema on the left temporal scalp. Most likely seborrheic dermatitis, cannot exclude tinea capitus. Does not appear to be an allergic reaction and he has no known new exposures. Vital signs are normal. Recommended to return if symptoms do not improve or worsen with use of ketoconazole.   - ketoconazole (NIZORAL) 2 % external shampoo  Dispense: 120 mL; Refill: 1       Return if symptoms worsen or fail to improve, for Routine preventive.    Yoon Love, MS3    I was present with the medical student who participated in the service and in the documentation of this note. I have verified the history and personally performed the physical exam and medical decision making, and have verified the content of the note, which accurately reflects my assessment of the patient and the plan of care.     Za Moreau MD  [facility]    Reta Pickering is a 53 year old, presenting for the following health issues:  Derm Problem (Ck face / redness )        4/27/2023     3:23 PM   Additional Questions   Roomed by HARMONY Oliva MA   Accompanied by Self     HPI     Ladan has a rash that has been present for 1 week. After he first noticed it, he also had a subjective fever and body aches. He did take any new medications prior to rash onset, has no known allergies, no travels, and no exposures to similar rash. He has had a rash like this before, about 1 month ago. At that time, it only lasted a few days and was not accompanied by fever or body aches. He says the rash is very itchy. It is not painful and there is no burning. It has not spread to anywhere besides his face and scalp. He has not noticed any bites or pustules. He feels there may be some swelling.     Rash  Onset/Duration: 1  week  Description  Location: face and scalp  Character: flakey  Itching: moderate  Intensity:  moderate  Progression of Symptoms:  improving  Accompanying signs and symptoms:   Fever: YES  Body aches or joint pain: YES  Sore throat symptoms: No  Recent cold symptoms: No  History:           Previous episodes of similar rash: yes, one month ago for a few days only without accompanying fever and body aches  New exposures:  None  Recent travel: No  Exposure to similar rash: No  Precipitating or alleviating factors: none  Therapies tried and outcome: none      Review of Systems   Constitutional, HEENT, cardiovascular, pulmonary, gi and gu systems are negative, except as otherwise noted.      Objective    /77   Pulse 80   Temp 97.8  F (36.6  C) (Oral)   Resp 24   Wt 60.6 kg (133 lb 9.6 oz)   SpO2 97%   BMI 24.59 kg/m    Body mass index is 24.59 kg/m .  Physical Exam   GENERAL: healthy, alert and no distress  EYES: Eyes grossly normal to inspection, PERRL and conjunctivae and sclerae normal  HENT: external ear anatomy normal  RESP: breathing well on room air, no acute respiratory distress  MS: no gross musculoskeletal defects noted, no edema  SKIN: flaky rash bilaterally on ears, temporal skin, and posterior to ears, flaking in scalp, lift temporal scalp with redness, few scattered papules behind ears  NEURO: Normal strength and tone, mentation intact and speech normal  PSYCH: mentation appears normal, affect normal/bright

## 2023-04-27 NOTE — PROGRESS NOTES
Preceptor Attestation:    I discussed the patient with the resident and evaluated the patient in person. I have verified the content of the note, which accurately reflects my assessment of the patient and the plan of care.   Supervising Physician:  Giorgio Paz MD.

## 2023-05-11 ENCOUNTER — OFFICE VISIT (OUTPATIENT)
Dept: FAMILY MEDICINE | Facility: CLINIC | Age: 53
End: 2023-05-11
Payer: COMMERCIAL

## 2023-05-11 VITALS
DIASTOLIC BLOOD PRESSURE: 75 MMHG | SYSTOLIC BLOOD PRESSURE: 107 MMHG | HEIGHT: 61 IN | WEIGHT: 131.8 LBS | HEART RATE: 61 BPM | RESPIRATION RATE: 14 BRPM | OXYGEN SATURATION: 98 % | BODY MASS INDEX: 24.88 KG/M2

## 2023-05-11 DIAGNOSIS — Z00.00 ROUTINE GENERAL MEDICAL EXAMINATION AT A HEALTH CARE FACILITY: Primary | ICD-10-CM

## 2023-05-11 DIAGNOSIS — R42 DIZZINESS: ICD-10-CM

## 2023-05-11 PROCEDURE — 99396 PREV VISIT EST AGE 40-64: CPT | Mod: GC | Performed by: STUDENT IN AN ORGANIZED HEALTH CARE EDUCATION/TRAINING PROGRAM

## 2023-05-11 RX ORDER — MECLIZINE HYDROCHLORIDE 25 MG/1
25 TABLET ORAL 3 TIMES DAILY PRN
Qty: 30 TABLET | Refills: 1 | Status: SHIPPED | OUTPATIENT
Start: 2023-05-11

## 2023-05-11 ASSESSMENT — ENCOUNTER SYMPTOMS
HEADACHES: 0
CONSTIPATION: 0
CHILLS: 0
NERVOUS/ANXIOUS: 0
NAUSEA: 0
FEVER: 0
WEAKNESS: 0
DIZZINESS: 0
PARESTHESIAS: 0
SORE THROAT: 0
SHORTNESS OF BREATH: 0
ABDOMINAL PAIN: 0
COUGH: 0
EYE PAIN: 0
DYSURIA: 0
JOINT SWELLING: 0
DIARRHEA: 0
HEARTBURN: 0
MYALGIAS: 0
FREQUENCY: 0
HEMATOCHEZIA: 0
PALPITATIONS: 0
HEMATURIA: 0
ARTHRALGIAS: 0

## 2023-05-11 NOTE — PATIENT INSTRUCTIONS
Do this exercise at home to help your dizziness    Preventive Health Recommendations  Male Ages 50 - 64    Yearly exam:             See your health care provider every year in order to  o   Review health changes.   o   Discuss preventive care.    o   Review your medicines if your doctor has prescribed any.     Have a cholesterol test every 5 years, or more frequently if you are at risk for high cholesterol/heart disease.     Have a diabetes test (fasting glucose) every three years. If you are at risk for diabetes, you should have this test more often.     Have a colonoscopy at age 50, or have a yearly FIT test (stool test). These exams will check for colon cancer.      Talk with your health care provider about whether or not a prostate cancer screening test (PSA) is right for you.    You should be tested each year for STDs (sexually transmitted diseases), if you re at risk.     Shots: Get a flu shot each year. Get a tetanus shot every 10 years.     Nutrition:    Eat at least 5 servings of fruits and vegetables daily.     Eat whole-grain bread, whole-wheat pasta and brown rice instead of white grains and rice.     Get adequate Calcium and Vitamin D.     Lifestyle    Exercise for at least 150 minutes a week (30 minutes a day, 5 days a week). This will help you control your weight and prevent disease.     Limit alcohol to one drink per day.     No smoking.     Wear sunscreen to prevent skin cancer.     See your dentist every six months for an exam and cleaning.     See your eye doctor every 1 to 2 years.

## 2023-05-11 NOTE — PROGRESS NOTES
SUBJECTIVE:   CC: Ladan is an 53 year old who presents for preventative health visit.       2023     4:07 PM   Additional Questions   Roomed by ng   Accompanied by self         2023     4:07 PM   Patient Reported Additional Medications   Patient reports taking the following new medications none   Patient has been advised of split billing requirements and indicates understanding: Yes  Healthy Habits:     Getting at least 3 servings of Calcium per day:  Yes    Bi-annual eye exam:  Yes    Dental care twice a year:  NO    Sleep apnea or symptoms of sleep apnea:  None    Diet:  Regular (no restrictions)    Frequency of exercise:  2-3 days/week    Duration of exercise:  15-30 minutes    Taking medications regularly:  Yes    Medication side effects:  None    PHQ-2 Total Score: 0    Additional concerns today:  No      Burning of feet after a long day of walking and standing.   No issues at rest  No swelling of feet    Also with some itchiness on underside of his neck.   On and off for a year     Still with itchiness and flakiness of face and scalp.     Used to have dizziness, but that got better.       Today's PHQ-2 Score:       2023     4:15 PM   PHQ-2 (  [employer])   Q1: Little interest or pleasure in doing things 0   Q2: Feeling down, depressed or hopeless 0   PHQ-2 Score 0   Q1: Little interest or pleasure in doing things Not at all   Q2: Feeling down, depressed or hopeless Not at all   PHQ-2 Score 0     Have you ever done Advance Care Planning? (For example, a Health Directive, POLST, or a discussion with a medical provider or your loved ones about your wishes): No, advance care planning information given to patient to review.  Patient declined advance care planning discussion at this time.    Social History     Tobacco Use     Smoking status: Former     Years: 20.00     Types: Cigarettes     Quit date:      Years since quittin.3     Smokeless tobacco: Never     Tobacco comments:     Smoked 3-4  "home-made cigarettes filled with tobacco when he smoked   Vaping Use     Vaping status: Never Used   Substance Use Topics     Alcohol use: No           5/11/2023     4:15 PM   Alcohol Use   Prescreen: >3 drinks/day or >7 drinks/week? No          View : No data to display.              Last PSA: No results found for: PSA    Reviewed orders with patient. Reviewed health maintenance and updated orders accordingly - Yes  Labs reviewed in EPIC    Reviewed and updated as needed this visit by clinical staff   Tobacco  Allergies  Meds              Reviewed and updated as needed this visit by Provider                 No past medical history on file.   No past surgical history on file.    Review of Systems   Constitutional: Negative for chills and fever.   HENT: Negative for congestion, ear pain, hearing loss and sore throat.    Eyes: Negative for pain and visual disturbance.   Respiratory: Negative for cough and shortness of breath.    Cardiovascular: Negative for chest pain, palpitations and peripheral edema.   Gastrointestinal: Negative for abdominal pain, constipation, diarrhea, heartburn, hematochezia and nausea.   Genitourinary: Negative for dysuria, frequency, genital sores, hematuria, impotence, penile discharge and urgency.   Musculoskeletal: Negative for arthralgias, joint swelling and myalgias.   Skin: Negative for rash.   Neurological: Negative for dizziness, weakness, headaches and paresthesias.   Psychiatric/Behavioral: Negative for mood changes. The patient is not nervous/anxious.      OBJECTIVE:   /75 (BP Location: Right arm, Patient Position: Sitting, Cuff Size: Adult Regular)   Pulse 61   Resp 14   Ht 1.549 m (5' 1\")   Wt 59.8 kg (131 lb 12.8 oz)   SpO2 98%   BMI 24.90 kg/m      Physical Exam  Constitutional:       General: He is not in acute distress.     Appearance: He is not ill-appearing.   HENT:      Right Ear: Tympanic membrane normal. There is no impacted cerumen.      Left Ear: " Tympanic membrane normal.      Nose: No congestion or rhinorrhea.      Mouth/Throat:      Mouth: Mucous membranes are moist.      Pharynx: No oropharyngeal exudate or posterior oropharyngeal erythema.   Eyes:      General:         Right eye: No discharge.         Left eye: No discharge.      Pupils: Pupils are equal, round, and reactive to light.   Cardiovascular:      Rate and Rhythm: Normal rate and regular rhythm.      Pulses: Normal pulses.      Heart sounds: Normal heart sounds. No murmur heard.  Pulmonary:      Effort: Pulmonary effort is normal. No respiratory distress.      Breath sounds: Normal breath sounds.   Abdominal:      General: Abdomen is flat. There is no distension.      Palpations: There is no mass.   Musculoskeletal:         General: No swelling. Normal range of motion.   Skin:     General: Skin is warm.      Coloration: Skin is not jaundiced.      Findings: No rash.      Comments: Dry patch on underside of chin   Neurological:      Mental Status: He is oriented to person, place, and time.      Sensory: No sensory deficit.      Motor: No weakness.       Diagnostic Test Results:  Labs reviewed in Epic    ASSESSMENT/PLAN:   Htjeovany was seen today for physical.    Diagnoses and all orders for this visit:    Routine general medical examination at a health care facility  Discussed risk/benefits/alternatives for preventative cares including lab work, colon cancer, lung cancer screening given smoking history, and immunizations but patient declines all those cares.   Pt to use ketoconazole shampoo on face and scalp as directed, re-iterated instructions as he said he hasn't been using it as much because didn't notice benefit after using it one time.     Dizziness  Refill, pt states symptoms are much improved. Declined further eval and management.   -     meclizine (ANTIVERT) 25 MG tablet; Take 1 tablet (25 mg) by mouth 3 times daily as needed for dizziness      Patient has been advised of split billing  requirements and indicates understanding: Yes    COUNSELING:   Reviewed preventive health counseling, as reflected in patient instructions    He reports that he quit smoking about 11 years ago. His smoking use included cigarettes. He has never used smokeless tobacco.      Dakota Castro DO  [facility]

## 2023-05-11 NOTE — PROGRESS NOTES
Preceptor Attestation:    I discussed the patient with the resident and evaluated the patient in person. I have verified the content of the note, which accurately reflects my assessment of the patient and the plan of care.   Supervising Physician:  Emanuel Méndez MD.

## 2024-01-09 ENCOUNTER — OFFICE VISIT (OUTPATIENT)
Dept: FAMILY MEDICINE | Facility: CLINIC | Age: 54
End: 2024-01-09
Payer: COMMERCIAL

## 2024-01-09 VITALS
SYSTOLIC BLOOD PRESSURE: 119 MMHG | RESPIRATION RATE: 16 BRPM | WEIGHT: 133.8 LBS | BODY MASS INDEX: 25.26 KG/M2 | HEART RATE: 67 BPM | TEMPERATURE: 98 F | OXYGEN SATURATION: 98 % | HEIGHT: 61 IN | DIASTOLIC BLOOD PRESSURE: 82 MMHG

## 2024-01-09 DIAGNOSIS — L21.9 SEBORRHEIC DERMATITIS: Primary | ICD-10-CM

## 2024-01-09 DIAGNOSIS — Z23 NEED FOR PROPHYLACTIC VACCINATION AND INOCULATION AGAINST INFLUENZA: ICD-10-CM

## 2024-01-09 PROCEDURE — 99213 OFFICE O/P EST LOW 20 MIN: CPT | Mod: 25 | Performed by: FAMILY MEDICINE

## 2024-01-09 PROCEDURE — 90686 IIV4 VACC NO PRSV 0.5 ML IM: CPT | Performed by: FAMILY MEDICINE

## 2024-01-09 PROCEDURE — 90471 IMMUNIZATION ADMIN: CPT | Performed by: FAMILY MEDICINE

## 2024-01-09 RX ORDER — DESONIDE 0.5 MG/G
CREAM TOPICAL 2 TIMES DAILY
Qty: 60 G | Refills: 1 | Status: SHIPPED | OUTPATIENT
Start: 2024-01-09

## 2024-01-21 NOTE — PROGRESS NOTES
"Assessment & Plan   Seborrheic dermatitis, not responding entirely to ketoconazole shampoo.  -- Refill ketoconazole shampoo.  -- Add desonide 0.05% cream.    Healthcare maintenance.  Flu shot given.    Follow-up as needed.    Subjective   Htjeovany Brandon is a 54 year old male with a history including back pain who presents with redness and itching of the face.    This happened earlier this year in April, at which time he was diagnosed seborrheic dermatitis and prescribed ketoconazole shampoo.  It worked then.  It is now returned, and does not respond entirely to the ketoconazole.  He describes itching, flaking, and redness of his face extending to his scalp.    Social: He reports that he quit smoking about 12 years ago. His smoking use included cigarettes. He has never used smokeless tobacco. He reports that he does not drink alcohol.    Objective   Vitals: /82 (BP Location: Left arm, Patient Position: Sitting, Cuff Size: Adult Small)   Pulse 67   Temp 98  F (36.7  C) (Oral)   Resp 16   Ht 1.56 m (5' 1.42\")   Wt 60.7 kg (133 lb 12.8 oz)   SpO2 98%   BMI 24.94 kg/m    General: Pleasant. Middle-aged man. No distress.  Skin: Face with erythematous plaques with flaking scales along the hairline, jaw, and nasolabial folds.  Psych: Appropriate grooming and hygiene. Speech normal rate. Appropriate mood and affect.    " Humira Counseling:  I discussed with the patient the risks of adalimumab including but not limited to myelosuppression, immunosuppression, autoimmune hepatitis, demyelinating diseases, lymphoma, and serious infections.  The patient understands that monitoring is required including a PPD at baseline and must alert us or the primary physician if symptoms of infection or other concerning signs are noted.

## 2024-10-24 ENCOUNTER — OFFICE VISIT (OUTPATIENT)
Dept: FAMILY MEDICINE | Facility: CLINIC | Age: 54
End: 2024-10-24
Payer: COMMERCIAL

## 2024-10-24 VITALS
RESPIRATION RATE: 16 BRPM | BODY MASS INDEX: 23.86 KG/M2 | TEMPERATURE: 97.6 F | OXYGEN SATURATION: 98 % | WEIGHT: 128 LBS | SYSTOLIC BLOOD PRESSURE: 113 MMHG | HEART RATE: 62 BPM | DIASTOLIC BLOOD PRESSURE: 78 MMHG

## 2024-10-24 DIAGNOSIS — L71.9 ROSACEA: Primary | ICD-10-CM

## 2024-10-24 DIAGNOSIS — L21.9 SEBORRHEIC DERMATITIS: ICD-10-CM

## 2024-10-24 RX ORDER — KETOCONAZOLE 20 MG/G
CREAM TOPICAL 2 TIMES DAILY
Qty: 60 G | Refills: 0 | Status: SHIPPED | OUTPATIENT
Start: 2024-10-24

## 2024-10-24 RX ORDER — AZELAIC ACID 0.15 G/G
GEL TOPICAL
Qty: 50 G | Refills: 0 | Status: SHIPPED | OUTPATIENT
Start: 2024-10-24

## 2024-10-24 RX ORDER — KETOCONAZOLE 20 MG/ML
SHAMPOO, SUSPENSION TOPICAL DAILY PRN
Qty: 120 ML | Refills: 0 | Status: SHIPPED | OUTPATIENT
Start: 2024-10-24

## 2024-10-24 NOTE — PROGRESS NOTES
Assessment & Plan     Rosacea, Seborrheic dermatitis  Pt presenting for facial and chin rash and flaky scalp. He has been treated twice in the past for seborrheic dermatitis with ketoconazole shampoo and desonide cream. Both times symptoms improve but do not completely resolve and then gradually return. On exam today, there is a large scaling plaque on the scalp, and maculopapular rash above nasal folds, notably sparing nasolabial folds, with some telangectasia and papules on the chin/neck. Exam and lack of symptom resolution lead me to suspect additional skin condition outside of seborrheic dermatitis. History and exam are suspicious for rosacea, however inner eyebrow flaking looks consistent with seborrheic dermatitis. Discussed with pt completing a trial of two skin treatments, one on each half of face, to see what improves the redness. He is agreeable to the plan and will try ketoconazole on the left side of his face and azelaic acid and metronidazole to the right side of his face. Will also use ketoconazole shampoo for possible seborrheic dermatitis of scalp. If incomplete resolution, must be suspicious of primary inflammatory skin condition like scalp psoriasis and treat with clobetasol shampoo. Return in 2 weeks for follow up.  - azelaic acid (FINACIA) 15 % external gel  Dispense: 50 g; Refill: 0  - metroNIDAZOLE (METROGEL) 0.75 % vaginal gel  Dispense: 70 g; Refill: 0  - ketoconazole (NIZORAL) 2 % external cream  Dispense: 60 g; Refill: 0  - ketoconazole (NIZORAL) 2 % external shampoo  Dispense: 120 mL; Refill: 0    Preceptor Attestation:   I was present with the medical student who participated in the service and in the documentation of this note. I have verified the history and personally performed the physical exam and medical decision making. I have verified the content of the note, which accurately reflects my assessment of the patient and the plan of care.   Supervising Physician:  Adrienne Jack,  MD             No follow-ups on file.    Subjective   Ladan is a 54 year old, presenting for the following health issues:  Other (Redness on face 2 times before and keeps coming back )        1/9/2024     3:44 PM   Additional Questions   Roomed by armani   Accompanied by self     HPI       Ladan is 54 year old male who presents today for redness over his face and chin and flaky, dry scalp. He has been treated for seborrheic dermatitis twice in the past with a ketoconazole shampoo and desonide cream. He states with the shampoo and cream he notes some improvement initially but the rash always returns gradually. The rash is over his face and chin and is itchy. He states the redness gets worse in the car when the hot air is blowing on his face. He denies itchy eyes or dryness. No one else in family has this rash. No spots around or on eyes. Denies alcohol use. No difference with spicy foods.         Objective    /78   Pulse 62   Temp 97.6  F (36.4  C) (Oral)   Resp 16   Wt 58.1 kg (128 lb)   SpO2 98%   BMI 23.86 kg/m    Body mass index is 23.86 kg/m .  Physical Exam   GENERAL: alert and no distress  SKIN: Photo attached below. Erythematous maculopapular rash superior to nasal folds bilaterally with scattered telangiectasias, as well as scattered 1-2mm papules over chin and neck. Sparing of nasolabial folds. Erythematous patches with greasy flakes along inner brows bilaterally. Over scalp there is 15cm diameter scaling plaque starting at hairline extending into scalp. Right forehead also has greasy flakes and redness.                    Nathalie Shearer, MS3  [employer]    Signed Electronically by: Adrienne Jack MD

## 2024-11-14 ENCOUNTER — OFFICE VISIT (OUTPATIENT)
Dept: FAMILY MEDICINE | Facility: CLINIC | Age: 54
End: 2024-11-14
Payer: COMMERCIAL

## 2024-11-14 VITALS
DIASTOLIC BLOOD PRESSURE: 71 MMHG | RESPIRATION RATE: 16 BRPM | OXYGEN SATURATION: 99 % | HEART RATE: 61 BPM | HEIGHT: 61 IN | BODY MASS INDEX: 24.55 KG/M2 | WEIGHT: 130 LBS | TEMPERATURE: 98.2 F | SYSTOLIC BLOOD PRESSURE: 104 MMHG

## 2024-11-14 DIAGNOSIS — L21.9 SEBORRHEIC DERMATITIS: ICD-10-CM

## 2024-11-14 DIAGNOSIS — L71.9 ROSACEA: Primary | ICD-10-CM

## 2024-11-14 NOTE — PROGRESS NOTES
"  Assessment & Plan     Rosacea, Seborrheic dermatitis:  Great improvement of skin findings with ketoconazole and azelaic acid on both sides. Patient to continue azelaic acid.    No follow-ups on file.    Subjective   Htee is a 54 year old, presenting for the following health issues:  Derm Problem (Follow up face)      11/14/2024     4:05 PM   Additional Questions   Roomed by Elizabeth         11/14/2024    Information    services provided? Yes   Language Romana   Type of interpretation provided Face-to-face    name De Nyraquel    Ag[employer]        Patient with considerable improvement in his facial symptoms. He has a lot less itching and his face has cleared up. He used ketoconazole on one side and metronidazole and azelaic acid as directed; he feels that they got better at about the same rate. He didn't notice any difference between them particularly.  He also used the ketoconazole shampoo. Feels that this worked fine too.           Objective    /71   Pulse 61   Temp 98.2  F (36.8  C)   Resp 16   Ht 1.56 m (5' 1.4\")   Wt 59 kg (130 lb)   SpO2 99%   BMI 24.24 kg/m    Body mass index is 24.24 kg/m .  Physical Exam  Constitutional:       General: He is not in acute distress.  Cardiovascular:      Rate and Rhythm: Normal rate and regular rhythm.   Skin:     Comments: Considerable improvement in the patient's facial symptoms. Seborrheic symptoms and scale and erythema have all improved considerably.   Neurological:      Mental Status: He is alert.                Signed Electronically by: Adrienne Jack MD    "